# Patient Record
Sex: MALE | Race: WHITE | NOT HISPANIC OR LATINO
[De-identification: names, ages, dates, MRNs, and addresses within clinical notes are randomized per-mention and may not be internally consistent; named-entity substitution may affect disease eponyms.]

---

## 2022-01-01 ENCOUNTER — APPOINTMENT (OUTPATIENT)
Dept: GASTROENTEROLOGY | Facility: GI CENTER | Age: 87
End: 2022-01-01

## 2022-01-01 ENCOUNTER — NON-APPOINTMENT (OUTPATIENT)
Age: 87
End: 2022-01-01

## 2022-01-14 ENCOUNTER — APPOINTMENT (OUTPATIENT)
Dept: GASTROENTEROLOGY | Facility: CLINIC | Age: 87
End: 2022-01-14
Payer: MEDICARE

## 2022-01-14 VITALS
OXYGEN SATURATION: 98 % | TEMPERATURE: 97.2 F | HEART RATE: 94 BPM | BODY MASS INDEX: 29.06 KG/M2 | DIASTOLIC BLOOD PRESSURE: 82 MMHG | SYSTOLIC BLOOD PRESSURE: 142 MMHG | HEIGHT: 63 IN | WEIGHT: 164 LBS | RESPIRATION RATE: 14 BRPM

## 2022-01-14 DIAGNOSIS — K62.5 HEMORRHAGE OF ANUS AND RECTUM: ICD-10-CM

## 2022-01-14 DIAGNOSIS — Z78.9 OTHER SPECIFIED HEALTH STATUS: ICD-10-CM

## 2022-01-14 DIAGNOSIS — Z86.79 PERSONAL HISTORY OF OTHER DISEASES OF THE CIRCULATORY SYSTEM: ICD-10-CM

## 2022-01-14 DIAGNOSIS — Z85.46 PERSONAL HISTORY OF MALIGNANT NEOPLASM OF PROSTATE: ICD-10-CM

## 2022-01-14 DIAGNOSIS — R58 HEMORRHAGE, NOT ELSEWHERE CLASSIFIED: ICD-10-CM

## 2022-01-14 PROBLEM — Z00.00 ENCOUNTER FOR PREVENTIVE HEALTH EXAMINATION: Status: ACTIVE | Noted: 2022-01-14

## 2022-01-14 PROCEDURE — 99204 OFFICE O/P NEW MOD 45 MIN: CPT

## 2022-01-14 RX ORDER — SODIUM SULFATE, POTASSIUM SULFATE, MAGNESIUM SULFATE 17.5; 3.13; 1.6 G/ML; G/ML; G/ML
17.5-3.13-1.6 SOLUTION, CONCENTRATE ORAL
Qty: 1 | Refills: 0 | Status: ACTIVE | COMMUNITY
Start: 2022-01-14 | End: 1900-01-01

## 2022-01-14 NOTE — HISTORY OF PRESENT ILLNESS
[de-identified] : The patient has undergone prior screening colonoscopies by Dr. Mcknight last of which was many years ago.  He does not recall ever having been told that he had colon polyps.  He was doing well until yesterday when he felt some moisture in his underpants and went to the bathroom at which time he cleansed the area with toilet paper and noted the presence of red blood.  It had an early morning bowel movement that same day which was unremarkable and another bowel movement today without any blood.  He denies any abdominal pain, nausea or vomiting.  There is no significant diarrhea or constipation.  His appetite and weight are stable.  Is no family history of colon cancer.  He denies any rectal or anal pain.  He has a history of prostate cancer for which she underwent radiation therapy many years ago.

## 2022-01-14 NOTE — PHYSICAL EXAM
[General Appearance - Alert] : alert [General Appearance - In No Acute Distress] : in no acute distress [General Appearance - Well Nourished] : well nourished [General Appearance - Well Developed] : well developed [General Appearance - Well-Appearing] : healthy appearing [Sclera] : the sclera and conjunctiva were normal [PERRL With Normal Accommodation] : pupils were equal in size, round, and reactive to light [Extraocular Movements] : extraocular movements were intact [Outer Ear] : the ears and nose were normal in appearance [Hearing Threshold Finger Rub Not [place]] : hearing was normal [Examination Of The Oral Cavity] : the lips and gums were normal [Neck Appearance] : the appearance of the neck was normal [Auscultation Breath Sounds / Voice Sounds] : lungs were clear to auscultation bilaterally [Heart Rate And Rhythm] : heart rate was normal and rhythm regular [Heart Sounds] : normal S1 and S2 [Heart Sounds Gallop] : no gallops [Murmurs] : no murmurs [Heart Sounds Pericardial Friction Rub] : no pericardial rub [Bowel Sounds] : normal bowel sounds [Abdomen Soft] : soft [Abdomen Tenderness] : non-tender [Abdomen Mass (___ Cm)] : no abdominal mass palpated [No CVA Tenderness] : no ~M costovertebral angle tenderness [No Spinal Tenderness] : no spinal tenderness [Abnormal Walk] : normal gait [Nail Clubbing] : no clubbing  or cyanosis of the fingernails [Musculoskeletal - Swelling] : no joint swelling seen [Motor Tone] : muscle strength and tone were normal [Skin Color & Pigmentation] : normal skin color and pigmentation [Skin Turgor] : normal skin turgor [] : no rash [Motor Exam] : the motor exam was normal [No Focal Deficits] : no focal deficits [Oriented To Time, Place, And Person] : oriented to person, place, and time [Impaired Insight] : insight and judgment were intact [Affect] : the affect was normal [Normal Sphincter Tone] : normal sphincter tone [No Rectal Mass] : no rectal mass [Occult Blood Positive] : stool was negative for occult blood [FreeTextEntry1] : It was scant brown stool present with no blood

## 2022-01-14 NOTE — ASSESSMENT
[FreeTextEntry1] : In all probability of the seepage of blood per rectum that occurred yesterday afternoon or evening was probably due to underlying hemorrhoids although none was seen or felt during the digital rectal exam.  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  I cannot exclude a distal colonic neoplasm either.  He will undergo a CBC, basic metabolic profile and prothrombin time and although he is 89 years old, he is a young 89 years and will be scheduled for colonoscopy as well.  He will require cardiac clearance. The risks, benefits, complications and possible adverse consequences associated with colonoscopy were discussed with the patient.\par

## 2022-02-15 ENCOUNTER — APPOINTMENT (OUTPATIENT)
Dept: GASTROENTEROLOGY | Facility: GI CENTER | Age: 87
End: 2022-02-15

## 2022-05-23 ENCOUNTER — TRANSCRIPTION ENCOUNTER (OUTPATIENT)
Age: 87
End: 2022-05-23

## 2022-05-26 ENCOUNTER — APPOINTMENT (OUTPATIENT)
Dept: DISASTER EMERGENCY | Facility: HOSPITAL | Age: 87
End: 2022-05-26

## 2023-01-01 ENCOUNTER — INPATIENT (INPATIENT)
Facility: HOSPITAL | Age: 88
LOS: 0 days | DRG: 871 | End: 2023-06-03
Attending: INTERNAL MEDICINE | Admitting: INTERNAL MEDICINE
Payer: MEDICARE

## 2023-01-01 VITALS
SYSTOLIC BLOOD PRESSURE: 85 MMHG | OXYGEN SATURATION: 94 % | RESPIRATION RATE: 22 BRPM | TEMPERATURE: 101 F | WEIGHT: 166.89 LBS | HEART RATE: 141 BPM | HEIGHT: 70 IN | DIASTOLIC BLOOD PRESSURE: 56 MMHG

## 2023-01-01 DIAGNOSIS — A41.9 SEPSIS, UNSPECIFIED ORGANISM: ICD-10-CM

## 2023-01-01 DIAGNOSIS — R77.8 OTHER SPECIFIED ABNORMALITIES OF PLASMA PROTEINS: ICD-10-CM

## 2023-01-01 LAB
ALBUMIN SERPL ELPH-MCNC: 3 G/DL — LOW (ref 3.3–5.2)
ALBUMIN SERPL ELPH-MCNC: 3.8 G/DL — SIGNIFICANT CHANGE UP (ref 3.3–5.2)
ALP SERPL-CCNC: 122 U/L — HIGH (ref 40–120)
ALP SERPL-CCNC: 132 U/L — HIGH (ref 40–120)
ALT FLD-CCNC: 30 U/L — SIGNIFICANT CHANGE UP
ALT FLD-CCNC: 87 U/L — HIGH
ANION GAP SERPL CALC-SCNC: 20 MMOL/L — HIGH (ref 5–17)
ANION GAP SERPL CALC-SCNC: 21 MMOL/L — HIGH (ref 5–17)
APPEARANCE UR: ABNORMAL
APTT BLD: 29.7 SEC — SIGNIFICANT CHANGE UP (ref 27.5–35.5)
AST SERPL-CCNC: 211 U/L — HIGH
AST SERPL-CCNC: 63 U/L — HIGH
BACTERIA # UR AUTO: ABNORMAL
BASE EXCESS BLDA CALC-SCNC: -19 MMOL/L — LOW (ref -2–3)
BASE EXCESS BLDA CALC-SCNC: 25.1 MMOL/L — HIGH (ref -2–3)
BASE EXCESS BLDV CALC-SCNC: -8.4 MMOL/L — LOW (ref -2–3)
BASE EXCESS BLDV CALC-SCNC: -9.2 MMOL/L — LOW (ref -2–3)
BASOPHILS # BLD AUTO: 0.02 K/UL — SIGNIFICANT CHANGE UP (ref 0–0.2)
BASOPHILS NFR BLD AUTO: 0.1 % — SIGNIFICANT CHANGE UP (ref 0–2)
BILIRUB SERPL-MCNC: 1 MG/DL — SIGNIFICANT CHANGE UP (ref 0.4–2)
BILIRUB SERPL-MCNC: 1.3 MG/DL — SIGNIFICANT CHANGE UP (ref 0.4–2)
BILIRUB UR-MCNC: NEGATIVE — SIGNIFICANT CHANGE UP
BLOOD GAS COMMENTS ARTERIAL: SIGNIFICANT CHANGE UP
BLOOD GAS COMMENTS ARTERIAL: SIGNIFICANT CHANGE UP
BUN SERPL-MCNC: 27.2 MG/DL — HIGH (ref 8–20)
BUN SERPL-MCNC: 30.9 MG/DL — HIGH (ref 8–20)
CA-I SERPL-SCNC: 1.08 MMOL/L — LOW (ref 1.15–1.33)
CALCIUM SERPL-MCNC: 7.9 MG/DL — LOW (ref 8.4–10.5)
CALCIUM SERPL-MCNC: 8.9 MG/DL — SIGNIFICANT CHANGE UP (ref 8.4–10.5)
CHLORIDE BLDV-SCNC: 99 MMOL/L — SIGNIFICANT CHANGE UP (ref 96–108)
CHLORIDE SERPL-SCNC: 100 MMOL/L — SIGNIFICANT CHANGE UP (ref 96–108)
CHLORIDE SERPL-SCNC: 96 MMOL/L — SIGNIFICANT CHANGE UP (ref 96–108)
CO2 SERPL-SCNC: 16 MMOL/L — LOW (ref 22–29)
CO2 SERPL-SCNC: 30 MMOL/L — HIGH (ref 22–29)
COLOR SPEC: ABNORMAL
COMMENT - URINE: SIGNIFICANT CHANGE UP
CREAT SERPL-MCNC: 1.79 MG/DL — HIGH (ref 0.5–1.3)
CREAT SERPL-MCNC: 1.94 MG/DL — HIGH (ref 0.5–1.3)
DIFF PNL FLD: ABNORMAL
EGFR: 32 ML/MIN/1.73M2 — LOW
EGFR: 35 ML/MIN/1.73M2 — LOW
EOSINOPHIL # BLD AUTO: 0 K/UL — SIGNIFICANT CHANGE UP (ref 0–0.5)
EOSINOPHIL NFR BLD AUTO: 0 % — SIGNIFICANT CHANGE UP (ref 0–6)
EPI CELLS # UR: SIGNIFICANT CHANGE UP
GAS PNL BLDA: SIGNIFICANT CHANGE UP
GAS PNL BLDV: 129 MMOL/L — LOW (ref 136–145)
GAS PNL BLDV: SIGNIFICANT CHANGE UP
GLUCOSE BLDC GLUCOMTR-MCNC: 196 MG/DL — HIGH (ref 70–99)
GLUCOSE BLDV-MCNC: 210 MG/DL — HIGH (ref 70–99)
GLUCOSE SERPL-MCNC: 206 MG/DL — HIGH (ref 70–99)
GLUCOSE SERPL-MCNC: 305 MG/DL — HIGH (ref 70–99)
GLUCOSE UR QL: NEGATIVE MG/DL — SIGNIFICANT CHANGE UP
HCO3 BLDA-SCNC: 12 MMOL/L — LOW (ref 21–28)
HCO3 BLDA-SCNC: 49 MMOL/L — CRITICAL HIGH (ref 21–28)
HCO3 BLDV-SCNC: 18 MMOL/L — LOW (ref 22–29)
HCO3 BLDV-SCNC: 18 MMOL/L — LOW (ref 22–29)
HCT VFR BLD CALC: 37.4 % — LOW (ref 39–50)
HCT VFR BLD CALC: 38.2 % — LOW (ref 39–50)
HCT VFR BLDA CALC: 39 % — SIGNIFICANT CHANGE UP
HGB BLD CALC-MCNC: 13.1 G/DL — SIGNIFICANT CHANGE UP (ref 12.6–17.4)
HGB BLD-MCNC: 12.4 G/DL — LOW (ref 13–17)
HGB BLD-MCNC: 13 G/DL — SIGNIFICANT CHANGE UP (ref 13–17)
HOROWITZ INDEX BLDA+IHG-RTO: 100 — SIGNIFICANT CHANGE UP
HOROWITZ INDEX BLDA+IHG-RTO: SIGNIFICANT CHANGE UP
IMM GRANULOCYTES NFR BLD AUTO: 0.5 % — SIGNIFICANT CHANGE UP (ref 0–0.9)
INR BLD: 1.25 RATIO — HIGH (ref 0.88–1.16)
KETONES UR-MCNC: ABNORMAL
LACTATE BLDV-MCNC: 5.4 MMOL/L — CRITICAL HIGH (ref 0.5–2)
LACTATE SERPL-SCNC: 10.6 MMOL/L — CRITICAL HIGH (ref 0.5–2)
LACTATE SERPL-SCNC: 11 MMOL/L — CRITICAL HIGH (ref 0.5–2)
LACTATE SERPL-SCNC: 13.1 MMOL/L — CRITICAL HIGH (ref 0.5–2)
LACTATE SERPL-SCNC: 13.6 MMOL/L — CRITICAL HIGH (ref 0.5–2)
LACTATE SERPL-SCNC: 8.6 MMOL/L — CRITICAL HIGH (ref 0.5–2)
LACTATE SERPL-SCNC: 9 MMOL/L — CRITICAL HIGH (ref 0.5–2)
LEUKOCYTE ESTERASE UR-ACNC: ABNORMAL
LYMPHOCYTES # BLD AUTO: 0.62 K/UL — LOW (ref 1–3.3)
LYMPHOCYTES # BLD AUTO: 3.7 % — LOW (ref 13–44)
MAGNESIUM SERPL-MCNC: 2.2 MG/DL — SIGNIFICANT CHANGE UP (ref 1.6–2.6)
MCHC RBC-ENTMCNC: 30.3 PG — SIGNIFICANT CHANGE UP (ref 27–34)
MCHC RBC-ENTMCNC: 30.7 PG — SIGNIFICANT CHANGE UP (ref 27–34)
MCHC RBC-ENTMCNC: 33.2 GM/DL — SIGNIFICANT CHANGE UP (ref 32–36)
MCHC RBC-ENTMCNC: 34 GM/DL — SIGNIFICANT CHANGE UP (ref 32–36)
MCV RBC AUTO: 90.3 FL — SIGNIFICANT CHANGE UP (ref 80–100)
MCV RBC AUTO: 91.4 FL — SIGNIFICANT CHANGE UP (ref 80–100)
MONOCYTES # BLD AUTO: 0.14 K/UL — SIGNIFICANT CHANGE UP (ref 0–0.9)
MONOCYTES NFR BLD AUTO: 0.8 % — LOW (ref 2–14)
MRSA PCR RESULT.: SIGNIFICANT CHANGE UP
NEUTROPHILS # BLD AUTO: 15.82 K/UL — HIGH (ref 1.8–7.4)
NEUTROPHILS NFR BLD AUTO: 94.9 % — HIGH (ref 43–77)
NITRITE UR-MCNC: NEGATIVE — SIGNIFICANT CHANGE UP
NT-PROBNP SERPL-SCNC: 3960 PG/ML — HIGH (ref 0–300)
PCO2 BLDA: 50 MMHG — HIGH (ref 35–48)
PCO2 BLDA: 67 MMHG — HIGH (ref 35–48)
PCO2 BLDV: 39 MMHG — LOW (ref 42–55)
PCO2 BLDV: 40 MMHG — LOW (ref 42–55)
PH BLDA: 7 — CRITICAL LOW (ref 7.35–7.45)
PH BLDA: 7.47 — HIGH (ref 7.35–7.45)
PH BLDV: 7.26 — LOW (ref 7.32–7.43)
PH BLDV: 7.28 — LOW (ref 7.32–7.43)
PH UR: 6.5 — SIGNIFICANT CHANGE UP (ref 5–8)
PHOSPHATE SERPL-MCNC: 4.2 MG/DL — SIGNIFICANT CHANGE UP (ref 2.4–4.7)
PLATELET # BLD AUTO: 109 K/UL — LOW (ref 150–400)
PLATELET # BLD AUTO: 229 K/UL — SIGNIFICANT CHANGE UP (ref 150–400)
PO2 BLDA: 60 MMHG — LOW (ref 83–108)
PO2 BLDA: 72 MMHG — LOW (ref 83–108)
PO2 BLDV: 140 MMHG — HIGH (ref 25–45)
PO2 BLDV: 92 MMHG — HIGH (ref 25–45)
POTASSIUM BLDV-SCNC: 4.5 MMOL/L — SIGNIFICANT CHANGE UP (ref 3.5–5.1)
POTASSIUM SERPL-MCNC: 4.6 MMOL/L — SIGNIFICANT CHANGE UP (ref 3.5–5.3)
POTASSIUM SERPL-MCNC: 4.9 MMOL/L — SIGNIFICANT CHANGE UP (ref 3.5–5.3)
POTASSIUM SERPL-SCNC: 4.6 MMOL/L — SIGNIFICANT CHANGE UP (ref 3.5–5.3)
POTASSIUM SERPL-SCNC: 4.9 MMOL/L — SIGNIFICANT CHANGE UP (ref 3.5–5.3)
PROT SERPL-MCNC: 5.3 G/DL — LOW (ref 6.6–8.7)
PROT SERPL-MCNC: 6.9 G/DL — SIGNIFICANT CHANGE UP (ref 6.6–8.7)
PROT UR-MCNC: 100
PROTHROM AB SERPL-ACNC: 14.5 SEC — HIGH (ref 10.5–13.4)
RAPID RVP RESULT: SIGNIFICANT CHANGE UP
RBC # BLD: 4.09 M/UL — LOW (ref 4.2–5.8)
RBC # BLD: 4.23 M/UL — SIGNIFICANT CHANGE UP (ref 4.2–5.8)
RBC # FLD: 11.9 % — SIGNIFICANT CHANGE UP (ref 10.3–14.5)
RBC # FLD: 12 % — SIGNIFICANT CHANGE UP (ref 10.3–14.5)
RBC CASTS # UR COMP ASSIST: >50 /HPF (ref 0–4)
S AUREUS DNA NOSE QL NAA+PROBE: SIGNIFICANT CHANGE UP
SAO2 % BLDA: 91.1 % — LOW (ref 94–98)
SAO2 % BLDA: 91.8 % — LOW (ref 94–98)
SAO2 % BLDV: 100 % — SIGNIFICANT CHANGE UP
SAO2 % BLDV: 98.5 % — SIGNIFICANT CHANGE UP
SARS-COV-2 RNA SPEC QL NAA+PROBE: SIGNIFICANT CHANGE UP
SODIUM SERPL-SCNC: 132 MMOL/L — LOW (ref 135–145)
SODIUM SERPL-SCNC: 150 MMOL/L — HIGH (ref 135–145)
SP GR SPEC: 1.01 — SIGNIFICANT CHANGE UP (ref 1.01–1.02)
TROPONIN T SERPL-MCNC: 0.03 NG/ML — SIGNIFICANT CHANGE UP (ref 0–0.06)
TROPONIN T SERPL-MCNC: 1.23 NG/ML — HIGH (ref 0–0.06)
TROPONIN T SERPL-MCNC: 7.82 NG/ML — HIGH (ref 0–0.06)
UROBILINOGEN FLD QL: NEGATIVE MG/DL — SIGNIFICANT CHANGE UP
WBC # BLD: 16.68 K/UL — HIGH (ref 3.8–10.5)
WBC # BLD: 36.18 K/UL — HIGH (ref 3.8–10.5)
WBC # FLD AUTO: 16.68 K/UL — HIGH (ref 3.8–10.5)
WBC # FLD AUTO: 36.18 K/UL — HIGH (ref 3.8–10.5)
WBC UR QL: ABNORMAL /HPF (ref 0–5)

## 2023-01-01 PROCEDURE — 84484 ASSAY OF TROPONIN QUANT: CPT

## 2023-01-01 PROCEDURE — 96365 THER/PROPH/DIAG IV INF INIT: CPT

## 2023-01-01 PROCEDURE — 71250 CT THORAX DX C-: CPT | Mod: 26

## 2023-01-01 PROCEDURE — 36620 INSERTION CATHETER ARTERY: CPT

## 2023-01-01 PROCEDURE — 87086 URINE CULTURE/COLONY COUNT: CPT

## 2023-01-01 PROCEDURE — 82803 BLOOD GASES ANY COMBINATION: CPT

## 2023-01-01 PROCEDURE — 74176 CT ABD & PELVIS W/O CONTRAST: CPT | Mod: 26

## 2023-01-01 PROCEDURE — 85730 THROMBOPLASTIN TIME PARTIAL: CPT

## 2023-01-01 PROCEDURE — 85027 COMPLETE CBC AUTOMATED: CPT

## 2023-01-01 PROCEDURE — 87040 BLOOD CULTURE FOR BACTERIA: CPT

## 2023-01-01 PROCEDURE — 82962 GLUCOSE BLOOD TEST: CPT

## 2023-01-01 PROCEDURE — 93970 EXTREMITY STUDY: CPT

## 2023-01-01 PROCEDURE — 99291 CRITICAL CARE FIRST HOUR: CPT | Mod: 25

## 2023-01-01 PROCEDURE — 83605 ASSAY OF LACTIC ACID: CPT

## 2023-01-01 PROCEDURE — 82330 ASSAY OF CALCIUM: CPT

## 2023-01-01 PROCEDURE — 82947 ASSAY GLUCOSE BLOOD QUANT: CPT

## 2023-01-01 PROCEDURE — 80053 COMPREHEN METABOLIC PANEL: CPT

## 2023-01-01 PROCEDURE — 84132 ASSAY OF SERUM POTASSIUM: CPT

## 2023-01-01 PROCEDURE — 96366 THER/PROPH/DIAG IV INF ADDON: CPT

## 2023-01-01 PROCEDURE — 71045 X-RAY EXAM CHEST 1 VIEW: CPT | Mod: 26,77

## 2023-01-01 PROCEDURE — 36600 WITHDRAWAL OF ARTERIAL BLOOD: CPT

## 2023-01-01 PROCEDURE — 83735 ASSAY OF MAGNESIUM: CPT

## 2023-01-01 PROCEDURE — 99291 CRITICAL CARE FIRST HOUR: CPT

## 2023-01-01 PROCEDURE — 83880 ASSAY OF NATRIURETIC PEPTIDE: CPT

## 2023-01-01 PROCEDURE — 94003 VENT MGMT INPAT SUBQ DAY: CPT

## 2023-01-01 PROCEDURE — 81001 URINALYSIS AUTO W/SCOPE: CPT

## 2023-01-01 PROCEDURE — 70450 CT HEAD/BRAIN W/O DYE: CPT | Mod: 26

## 2023-01-01 PROCEDURE — 93010 ELECTROCARDIOGRAM REPORT: CPT | Mod: 76

## 2023-01-01 PROCEDURE — 71250 CT THORAX DX C-: CPT | Mod: MA

## 2023-01-01 PROCEDURE — 0225U NFCT DS DNA&RNA 21 SARSCOV2: CPT

## 2023-01-01 PROCEDURE — 87641 MR-STAPH DNA AMP PROBE: CPT

## 2023-01-01 PROCEDURE — 87640 STAPH A DNA AMP PROBE: CPT

## 2023-01-01 PROCEDURE — 84295 ASSAY OF SERUM SODIUM: CPT

## 2023-01-01 PROCEDURE — 84100 ASSAY OF PHOSPHORUS: CPT

## 2023-01-01 PROCEDURE — 70450 CT HEAD/BRAIN W/O DYE: CPT | Mod: MD

## 2023-01-01 PROCEDURE — 85610 PROTHROMBIN TIME: CPT

## 2023-01-01 PROCEDURE — 82435 ASSAY OF BLOOD CHLORIDE: CPT

## 2023-01-01 PROCEDURE — 93970 EXTREMITY STUDY: CPT | Mod: 26

## 2023-01-01 PROCEDURE — 85018 HEMOGLOBIN: CPT

## 2023-01-01 PROCEDURE — 87186 SC STD MICRODIL/AGAR DIL: CPT

## 2023-01-01 PROCEDURE — 93005 ELECTROCARDIOGRAM TRACING: CPT

## 2023-01-01 PROCEDURE — 71045 X-RAY EXAM CHEST 1 VIEW: CPT

## 2023-01-01 PROCEDURE — 71045 X-RAY EXAM CHEST 1 VIEW: CPT | Mod: 26

## 2023-01-01 PROCEDURE — 94760 N-INVAS EAR/PLS OXIMETRY 1: CPT

## 2023-01-01 PROCEDURE — 94002 VENT MGMT INPAT INIT DAY: CPT

## 2023-01-01 PROCEDURE — 94660 CPAP INITIATION&MGMT: CPT

## 2023-01-01 PROCEDURE — 36415 COLL VENOUS BLD VENIPUNCTURE: CPT

## 2023-01-01 PROCEDURE — 74176 CT ABD & PELVIS W/O CONTRAST: CPT | Mod: MA

## 2023-01-01 PROCEDURE — 36569 INSJ PICC 5 YR+ W/O IMAGING: CPT

## 2023-01-01 PROCEDURE — 85014 HEMATOCRIT: CPT

## 2023-01-01 PROCEDURE — 85025 COMPLETE CBC W/AUTO DIFF WBC: CPT

## 2023-01-01 RX ORDER — NOREPINEPHRINE BITARTRATE/D5W 8 MG/250ML
0.05 PLASTIC BAG, INJECTION (ML) INTRAVENOUS
Qty: 8 | Refills: 0 | Status: DISCONTINUED | OUTPATIENT
Start: 2023-01-01 | End: 2023-01-01

## 2023-01-01 RX ORDER — VANCOMYCIN HCL 1 G
VIAL (EA) INTRAVENOUS
Refills: 0 | Status: DISCONTINUED | OUTPATIENT
Start: 2023-01-01 | End: 2023-01-01

## 2023-01-01 RX ORDER — PIPERACILLIN AND TAZOBACTAM 4; .5 G/20ML; G/20ML
3.38 INJECTION, POWDER, LYOPHILIZED, FOR SOLUTION INTRAVENOUS ONCE
Refills: 0 | Status: COMPLETED | OUTPATIENT
Start: 2023-01-01 | End: 2023-01-01

## 2023-01-01 RX ORDER — HYDROMORPHONE HYDROCHLORIDE 2 MG/ML
0.5 INJECTION INTRAMUSCULAR; INTRAVENOUS; SUBCUTANEOUS
Qty: 100 | Refills: 0 | Status: DISCONTINUED | OUTPATIENT
Start: 2023-01-01 | End: 2023-01-01

## 2023-01-01 RX ORDER — VANCOMYCIN HCL 1 G
1000 VIAL (EA) INTRAVENOUS ONCE
Refills: 0 | Status: DISCONTINUED | OUTPATIENT
Start: 2023-01-01 | End: 2023-01-01

## 2023-01-01 RX ORDER — FUROSEMIDE 40 MG
40 TABLET ORAL ONCE
Refills: 0 | Status: DISCONTINUED | OUTPATIENT
Start: 2023-01-01 | End: 2023-01-01

## 2023-01-01 RX ORDER — NOREPINEPHRINE BITARTRATE/D5W 8 MG/250ML
0.05 PLASTIC BAG, INJECTION (ML) INTRAVENOUS
Qty: 16 | Refills: 0 | Status: DISCONTINUED | OUTPATIENT
Start: 2023-01-01 | End: 2023-01-01

## 2023-01-01 RX ORDER — PIPERACILLIN AND TAZOBACTAM 4; .5 G/20ML; G/20ML
3.38 INJECTION, POWDER, LYOPHILIZED, FOR SOLUTION INTRAVENOUS EVERY 8 HOURS
Refills: 0 | Status: DISCONTINUED | OUTPATIENT
Start: 2023-01-01 | End: 2023-01-01

## 2023-01-01 RX ORDER — HYDROMORPHONE HYDROCHLORIDE 2 MG/ML
0.5 INJECTION INTRAMUSCULAR; INTRAVENOUS; SUBCUTANEOUS
Qty: 10 | Refills: 0 | Status: DISCONTINUED | OUTPATIENT
Start: 2023-01-01 | End: 2023-01-01

## 2023-01-01 RX ORDER — ACETAMINOPHEN 500 MG
1000 TABLET ORAL ONCE
Refills: 0 | Status: COMPLETED | OUTPATIENT
Start: 2023-01-01 | End: 2023-01-01

## 2023-01-01 RX ORDER — SODIUM CHLORIDE 9 MG/ML
2300 INJECTION INTRAMUSCULAR; INTRAVENOUS; SUBCUTANEOUS ONCE
Refills: 0 | Status: COMPLETED | OUTPATIENT
Start: 2023-01-01 | End: 2023-01-01

## 2023-01-01 RX ORDER — VANCOMYCIN HCL 1 G
1000 VIAL (EA) INTRAVENOUS EVERY 24 HOURS
Refills: 0 | Status: DISCONTINUED | OUTPATIENT
Start: 2023-01-01 | End: 2023-01-01

## 2023-01-01 RX ORDER — PHENYLEPHRINE HYDROCHLORIDE 10 MG/ML
9 INJECTION INTRAVENOUS
Qty: 160 | Refills: 0 | Status: DISCONTINUED | OUTPATIENT
Start: 2023-01-01 | End: 2023-01-01

## 2023-01-01 RX ORDER — CHLORHEXIDINE GLUCONATE 213 G/1000ML
1 SOLUTION TOPICAL DAILY
Refills: 0 | Status: DISCONTINUED | OUTPATIENT
Start: 2023-01-01 | End: 2023-01-01

## 2023-01-01 RX ORDER — VASOPRESSIN 20 [USP'U]/ML
0.04 INJECTION INTRAVENOUS
Qty: 40 | Refills: 0 | Status: DISCONTINUED | OUTPATIENT
Start: 2023-01-01 | End: 2023-01-01

## 2023-01-01 RX ORDER — PHENYLEPHRINE HYDROCHLORIDE 10 MG/ML
1 INJECTION INTRAVENOUS
Qty: 40 | Refills: 0 | Status: DISCONTINUED | OUTPATIENT
Start: 2023-01-01 | End: 2023-01-01

## 2023-01-01 RX ORDER — VANCOMYCIN HCL 1 G
1000 VIAL (EA) INTRAVENOUS ONCE
Refills: 0 | Status: COMPLETED | OUTPATIENT
Start: 2023-01-01 | End: 2023-01-01

## 2023-01-01 RX ORDER — FUROSEMIDE 40 MG
80 TABLET ORAL ONCE
Refills: 0 | Status: COMPLETED | OUTPATIENT
Start: 2023-01-01 | End: 2023-01-01

## 2023-01-01 RX ORDER — BUMETANIDE 0.25 MG/ML
0.2 INJECTION INTRAMUSCULAR; INTRAVENOUS
Qty: 20 | Refills: 0 | Status: DISCONTINUED | OUTPATIENT
Start: 2023-01-01 | End: 2023-01-01

## 2023-01-01 RX ORDER — VANCOMYCIN HCL 1 G
1000 VIAL (EA) INTRAVENOUS EVERY 12 HOURS
Refills: 0 | Status: DISCONTINUED | OUTPATIENT
Start: 2023-01-01 | End: 2023-01-01

## 2023-01-01 RX ORDER — CHLORHEXIDINE GLUCONATE 213 G/1000ML
15 SOLUTION TOPICAL EVERY 12 HOURS
Refills: 0 | Status: DISCONTINUED | OUTPATIENT
Start: 2023-01-01 | End: 2023-01-01

## 2023-01-01 RX ADMIN — SODIUM CHLORIDE 2300 MILLILITER(S): 9 INJECTION INTRAMUSCULAR; INTRAVENOUS; SUBCUTANEOUS at 10:35

## 2023-01-01 RX ADMIN — PIPERACILLIN AND TAZOBACTAM 25 GRAM(S): 4; .5 INJECTION, POWDER, LYOPHILIZED, FOR SOLUTION INTRAVENOUS at 05:19

## 2023-01-01 RX ADMIN — Medication 3.55 MICROGRAM(S)/KG/MIN: at 23:04

## 2023-01-01 RX ADMIN — Medication 7.1 MICROGRAM(S)/KG/MIN: at 18:53

## 2023-01-01 RX ADMIN — Medication 1000 MILLIGRAM(S): at 10:05

## 2023-01-01 RX ADMIN — PIPERACILLIN AND TAZOBACTAM 25 GRAM(S): 4; .5 INJECTION, POWDER, LYOPHILIZED, FOR SOLUTION INTRAVENOUS at 22:58

## 2023-01-01 RX ADMIN — PHENYLEPHRINE HYDROCHLORIDE 28.4 MICROGRAM(S)/KG/MIN: 10 INJECTION INTRAVENOUS at 15:58

## 2023-01-01 RX ADMIN — PHENYLEPHRINE HYDROCHLORIDE 28.4 MICROGRAM(S)/KG/MIN: 10 INJECTION INTRAVENOUS at 18:53

## 2023-01-01 RX ADMIN — HYDROMORPHONE HYDROCHLORIDE 0.5 MG/HR: 2 INJECTION INTRAMUSCULAR; INTRAVENOUS; SUBCUTANEOUS at 21:05

## 2023-01-01 RX ADMIN — PIPERACILLIN AND TAZOBACTAM 25 GRAM(S): 4; .5 INJECTION, POWDER, LYOPHILIZED, FOR SOLUTION INTRAVENOUS at 13:48

## 2023-01-01 RX ADMIN — Medication 1000 MILLIGRAM(S): at 10:30

## 2023-01-01 RX ADMIN — CHLORHEXIDINE GLUCONATE 15 MILLILITER(S): 213 SOLUTION TOPICAL at 18:37

## 2023-01-01 RX ADMIN — Medication 166.67 MILLIGRAM(S): at 10:09

## 2023-01-01 RX ADMIN — CHLORHEXIDINE GLUCONATE 15 MILLILITER(S): 213 SOLUTION TOPICAL at 05:20

## 2023-01-01 RX ADMIN — PIPERACILLIN AND TAZOBACTAM 200 GRAM(S): 4; .5 INJECTION, POWDER, LYOPHILIZED, FOR SOLUTION INTRAVENOUS at 09:33

## 2023-01-01 RX ADMIN — Medication 7.1 MICROGRAM(S)/KG/MIN: at 11:36

## 2023-01-01 RX ADMIN — Medication 80 MILLIGRAM(S): at 15:53

## 2023-01-01 RX ADMIN — Medication 1000 MILLIGRAM(S): at 11:15

## 2023-01-01 RX ADMIN — Medication 7.1 MICROGRAM(S)/KG/MIN: at 15:58

## 2023-01-01 RX ADMIN — PHENYLEPHRINE HYDROCHLORIDE 128 MICROGRAM(S)/KG/MIN: 10 INJECTION INTRAVENOUS at 21:05

## 2023-01-01 RX ADMIN — SODIUM CHLORIDE 2300 MILLILITER(S): 9 INJECTION INTRAMUSCULAR; INTRAVENOUS; SUBCUTANEOUS at 09:25

## 2023-01-01 RX ADMIN — Medication 400 MILLIGRAM(S): at 09:50

## 2023-01-01 RX ADMIN — Medication 3.55 MICROGRAM(S)/KG/MIN: at 21:05

## 2023-01-01 RX ADMIN — PIPERACILLIN AND TAZOBACTAM 3.38 GRAM(S): 4; .5 INJECTION, POWDER, LYOPHILIZED, FOR SOLUTION INTRAVENOUS at 10:03

## 2023-06-02 NOTE — ED PROVIDER NOTE - CRITICAL CARE ATTENDING CONTRIBUTION TO CARE
Upon my evaluation, this patient had a high probability of imminent or life-threatening deterioration due to septic shock  , which required my direct attention, intervention, and personal management.    I have personally provided _40  minutes of critical care time exclusive of time spent on separately billable procedures.  Time includes review of laboratory data, radiology results, discussion with consultants, and monitoring for potential decompensation.  Interventions were performed as documented.

## 2023-06-02 NOTE — H&P ADULT - NS PANP COMMENT GEN_ALL_CORE FT
89M w/ hx of prostate cancer with prior radiation on leupron, HTN, recurrent UTI presented to the ED from urology due to hematuria and generalized malaise. Pt was found to be febrile and hypotensive on presentation and received 2.5L of fluids with persistent hypotension requiring initiation of levophed. Pt also with progressively worsening hypoxia in the ED requiring initiation of BIPAP. CT head showed no acute findings. CT chest/abd/pelvis showed bilateral ground glass infiltrates, RLL consolidation, trace L hydroureteronephrosis and L perinephric stranding with cirrhosis, thick walled esophagus. Pt started on empiric broad spectrum abx. When seen in the ED, pt was awake and answering appropriately and appeared comfortable on BIPAP but remained on levophed 0.2mcg. Pt was eventually transitioned to nasal cannula. He was transported to the MICU and still awake and conversive with daughter during this time but suddenly decompensated on arrival to MICU becoming obtunded with agonal breathing and hypoxia. He quickly progressed to PEA arrest and CPR was initiated. He was intubated during resuscitation with bloody/frothy secretions noted. He underwent ACLS protocol ~20 mins with rhythm PEA/asystole with multiple epi, bicarb x 2 given. Fingerstick wnl. ROSC was subsequently obtained but pt was in profound shock requiring 3 pressors and was severely hypoxic with sats only high 80s on 100% PEEP 12. Pt with bilateral B lines and CXR with diffuse bilateral infiltrates worse on the L. He was given lasix with minimal urine output. He received paralytic/etomidate during intubation but no neurologic function noted post arrest. He was continued on broad spectrum abx. Troponin uptrended post arrest; was seen by cardiology with further ischemic eval pending neurologic recovery/stability/Echo. Ultrasound with lung sliding b/l, no large pericardial effusion, RV not grossly enlarged. Children and wife at the bedside were updated in detail regarding events leading up to his cardiac arrest and current critical state. They were advised that given the trajectory with increasing pressor requirements, high oxygen requirements and poor urine output, it is anticipated the patient will not survive through the night. His nephew Dr. Bro was also updated per family request regarding his escalating pressor requirements and high vent requirements with no noted neurologic recovery. Family reports understanding of his current situation and agree with DNR but would like to hold off on terminal extubation at this time.

## 2023-06-02 NOTE — H&P ADULT - HISTORY OF PRESENT ILLNESS
REASON FOR CONSULT: Shock    CONSULT REQUESTED BY: Dr. Shankar    Patient is a 89y old  Male who presents with a chief complaint of weakness    BRIEF HOSPITAL COURSE: Patient is a 89 yo male with PMH of prostate cancer treated with leupron, HTN and hx of frequent UTIs. Patient reported hematuria and urinary retention overnight, decided to come in for worsening weakness. In the ER, patient found to febrile, tachycardic, hypotensive and hypoxic. Sepsis work-up initiated - given vancomycin, zosyn, 30cc/kg IVF, Ofirmev, Cee placed and RVP. Patient with increasing oxygen requirements, initially placed on nasal cannula, uptitrated to NRB then ultimately to Bipap 12/5/50%. Levophed started for MAP <65. MICU consulted. Patients daughter (Elena) at bedside at time of evaluation. Reports that he did endorse chest discomfort with EMS, but currently chest pain free. Endorses breathing is much better and requesting to come off bipap.

## 2023-06-02 NOTE — CONSULT NOTE ADULT - SUBJECTIVE AND OBJECTIVE BOX
[facility]                                              CARDIOLOGY AT [place] [address]                                             Telephone: [phone]. Fax:[phone]                                                       CARDIOLOGY CONSULTATION NOTE                                                                                             History obtained by: Patient and medical record  Community Cardiologist:  Unknown   obtained: Yes [  ] No [  ] na  Reason for Consultation: PEA arrest, elevated trops.    Available out pt records reviewed: Yes [ x] No [  ]    Chief complaint:    Patient is a 89y old  Male who presents with a chief complaint of UTI and hematuria    HPI:  89 yo M hx of prostate ca on leupron, HTN, and HLD,  p/w hematuria, weakness, and fever.   Patient s/p PEA arrest in ICU.  Wife at bedside is a poor historian.  Wife states he had a UTI and saw his PCP and was on antibiotic since yesterday.  Today patient felt worse, chills, fever, and hematuria was noted so he presented to [facility].   Patient report hematuria and urinary retension last night. Today he felt very weak and tired. no sob. no chest pain. no abdominal pain. patient found to be febrile, tachycardic, hypotensive, and hypoxic. sepsis work up initiated.     CARDIAC TESTING   ECHO:    STRESS:    CATH:     ELECTROPHYSIOLOGY:     PAST MEDICAL HISTORY  HTN  HDL    PAST SURGICAL HISTORY    SOCIAL HISTORY:  Denies smoking/alcohol/drugs  CIGARETTES:     ALCOHOL:  DRUGS:    FAMILY HISTORY:  Uknown  Family History of Cardiovascular Disease:  Yes [  ] No [  ]  Coronary Artery Disease in first degree relative: Yes [  ] No [  ]  Sudden Cardiac Death in First degree relative: Yes [  ] No [  ]    HOME MEDICATIONS:      CURRENT CARDIAC MEDICATIONS:  norepinephrine Infusion 0.05 MICROgram(s)/kG/Min IV Continuous <Continuous>  phenylephrine    Infusion 1 MICROgram(s)/kG/Min IV Continuous <Continuous>    CURRENT OTHER MEDICATIONS:  chlorhexidine 0.12% Liquid 15 milliLiter(s) Oral Mucosa every 12 hours  piperacillin/tazobactam IVPB.. 3.375 Gram(s) IV Intermittent every 8 hours, Stop order after: 7 Days  vasopressin Infusion 0.04 Unit(s)/Min (6 mL/Hr) IV Continuous <Continuous>    ALLERGIES:   No Known Allergies      REVIEW OF SYMPTOMS:  - unable to attain    VITAL SIGNS:  T(C): 36.4 (23 @ 16:00), Max: 38.3 (23 @ 08:55)  T(F): 97.6 (23 @ 16:00), Max: 101 (23 @ 08:55)  HR: 142 (23 @ 17:00) (78 - 185)  BP: 121/75 (23 @ 17:00) (75/56 - 182/114)  RR: 22 (23 @ 17:00) (14 - 28)  SpO2: 90% (23 @ 17:00) (84% - 100%)    INTAKE AND OUTPUT:      @ 07:01  -   @ 17:21  --------------------------------------------------------  IN: 462 mL / OUT: 0 mL / NET: 462 mL  PHYSICAL EXAM:  Constitutional:   Obtunded, vented  HEENT: Atraumatic and normocephalic , neck is supple . no JVD. No carotid bruit.  CNS: A&Ox 0, does not respond to tactile or verbal stimuli  Respiratory: CTAB, unlabored   Cardiovascular: RRR normal s1 s2. No murmur. No rubs or gallop.  Gastrointestinal: Soft, non-tender. +Bowel sounds.   Extremities: + Peripheral Pulses, No clubbing, cyanosis, or edema  Psychiatric: Calm . no agitation.   Skin: Warm and dry, no ulcers on extremities     LABS:  ( 2023 15:02 )  Troponin T  1.23<H>,  CPK  X    , CKMB  X    , BNP X        , ( 2023 09:05 )  Troponin T  0.03 ,  CPK  X    , CKMB  X    , BNP X                           13.0   36.18 )-----------( 109      ( 2023 15:02 )             38.2     06-02    150<H>  |  100  |  27.2<H>  ----------------------------<  305<H>  4.6   |  30.0<H>  |  1.79<H>    Ca    7.9<L>      2023 15:02  Phos  4.2     06-02  Mg     2.2     06-02    TPro  5.3<L>  /  Alb  3.0<L>  /  TBili  1.0  /  DBili  x   /  AST  211<H>  /  ALT  87<H>  /  AlkPhos  132<H>  06-02    PT/INR - ( 2023 09:05 )   PT: 14.5 sec;   INR: 1.25 ratio       PTT - ( 2023 09:05 )  PTT:29.7 sec  Urinalysis Basic - ( 2023 11:17 )    Color: Red / Appearance: Slightly Turbid / S.010 / pH: x  Gluc: x / Ketone: Small  / Bili: Negative / Urobili: Negative mg/dL   Blood: x / Protein: 100 / Nitrite: Negative   Leuk Esterase: Small / RBC: >50 /HPF / WBC 26-50 /HPF   Sq Epi: x / Non Sq Epi: x / Bacteria: Few    INTERPRETATION OF TELEMETRY:   St 140    ECG:  St, RBBB,  and Q waves in V1 and V2.  Prior ECG: Yes [  ] No [  ]                                                  [facility]                                              CARDIOLOGY AT [place] [address]                                             Telephone: [phone]. Fax:[phone]                                                       CARDIOLOGY CONSULTATION NOTE                                                                                             History obtained by: Patient and medical record  Community Cardiologist:  Unknown   obtained: Yes [  ] No [  ] na  Reason for Consultation: PEA arrest, elevated trops.    Available out pt records reviewed: Yes [ x] No [  ]    Chief complaint:    Patient is a 89y old  Male who presents with a chief complaint of UTI and hematuria    HPI:  89 yo M hx of prostate ca on leupron, HTN, and HLD,  p/w hematuria, retention, weakness, and fever.   Patient now s/p PEA arrest in ICU.   Wife at bedside - a poor historian.  Wife states he had a UTI and saw his PCP and was started on antibiotic therapy since yesterday.  Today patient felt worse, with chills, fever, hematuria and retention and presented to [facility].  In ED patient also found to be febrile, tachycardic, hypotensive, and hypoxic and a sepsis work up was initiated.   He was placed on on bipap stabilized and transferred up to ICU were he became hypoxic and had a PEA  arrest.    A cardiac consult was called due to PEA arrest and elevated troponins.  Patient is currently vented, obtunded, on pressors and not responding to tactile or verbal stimuli.       CARDIAC TESTING   ECHO:    STRESS:    CATH:     ELECTROPHYSIOLOGY:     PAST MEDICAL HISTORY  HTN  HDL    PAST SURGICAL HISTORY    SOCIAL HISTORY:  Denies smoking/alcohol/drugs  CIGARETTES:     ALCOHOL:  DRUGS:    FAMILY HISTORY:  Uknown  Family History of Cardiovascular Disease:  Yes [  ] No [  ]  Coronary Artery Disease in first degree relative: Yes [  ] No [  ]  Sudden Cardiac Death in First degree relative: Yes [  ] No [  ]    HOME MEDICATIONS:      CURRENT CARDIAC MEDICATIONS:  norepinephrine Infusion 0.05 MICROgram(s)/kG/Min IV Continuous <Continuous>  phenylephrine    Infusion 1 MICROgram(s)/kG/Min IV Continuous <Continuous>    CURRENT OTHER MEDICATIONS:  chlorhexidine 0.12% Liquid 15 milliLiter(s) Oral Mucosa every 12 hours  piperacillin/tazobactam IVPB.. 3.375 Gram(s) IV Intermittent every 8 hours, Stop order after: 7 Days  vasopressin Infusion 0.04 Unit(s)/Min (6 mL/Hr) IV Continuous <Continuous>    ALLERGIES:   No Known Allergies      REVIEW OF SYMPTOMS:  - unable to attain    VITAL SIGNS:  T(C): 36.4 (23 @ 16:00), Max: 38.3 (23 @ 08:55)  T(F): 97.6 (23 @ 16:00), Max: 101 (23 @ 08:55)  HR: 142 (23 @ 17:00) (78 - 185)  BP: 121/75 (23 @ 17:00) (75/56 - 182/114)  RR: 22 (23 @ 17:00) (14 - 28)  SpO2: 90% (23 @ 17:00) (84% - 100%)    INTAKE AND OUTPUT:      @ 07:01  -   @ 17:21  --------------------------------------------------------  IN: 462 mL / OUT: 0 mL / NET: 462 mL  PHYSICAL EXAM:  Constitutional:   Obtunded, vented  HEENT: Atraumatic and normocephalic , neck is supple . no JVD. No carotid bruit.  CNS: A&Ox 0, does not respond to tactile or verbal stimuli  Respiratory: CTAB, unlabored   Cardiovascular: RRR normal s1 s2. No murmur. No rubs or gallop.  Gastrointestinal: Soft, non-tender. +Bowel sounds.   Extremities: + Peripheral Pulses, No clubbing, cyanosis, or edema  Psychiatric: Calm . no agitation.   Skin: Warm and dry, no ulcers on extremities     LABS:  ( 2023 15:02 )  Troponin T  1.23<H>,  CPK  X    , CKMB  X    , BNP X        , ( 2023 09:05 )  Troponin T  0.03 ,  CPK  X    , CKMB  X    , BNP X                           13.0   36.18 )-----------( 109      ( 2023 15:02 )             38.2     06-02    150<H>  |  100  |  27.2<H>  ----------------------------<  305<H>  4.6   |  30.0<H>  |  1.79<H>    Ca    7.9<L>      2023 15:02  Phos  4.2     06-02  Mg     2.2     06-02    TPro  5.3<L>  /  Alb  3.0<L>  /  TBili  1.0  /  DBili  x   /  AST  211<H>  /  ALT  87<H>  /  AlkPhos  132<H>  06-02    PT/INR - ( 2023 09:05 )   PT: 14.5 sec;   INR: 1.25 ratio       PTT - ( 2023 09:05 )  PTT:29.7 sec  Urinalysis Basic - ( 2023 11:17 )    Color: Red / Appearance: Slightly Turbid / S.010 / pH: x  Gluc: x / Ketone: Small  / Bili: Negative / Urobili: Negative mg/dL   Blood: x / Protein: 100 / Nitrite: Negative   Leuk Esterase: Small / RBC: >50 /HPF / WBC 26-50 /HPF   Sq Epi: x / Non Sq Epi: x / Bacteria: Few    INTERPRETATION OF TELEMETRY:   St 140    ECG:  St, RBBB,  and Q waves in V1 and V2.  Prior ECG: Yes [  ] No [  ]

## 2023-06-02 NOTE — ED ADULT NURSE REASSESSMENT NOTE - NSFALLHARMRISKINTERV_ED_ALL_ED

## 2023-06-02 NOTE — ED PROVIDER NOTE - PROGRESS NOTE DETAILS
patient received about 2.5 L IVF, hypotensive, more lethargic. started on Levophed. cxr showed pulonary edema vs mild infiltrate. mental status improved in levophed and bipap. MICU at bedside for evaluation. Daughter spoke with other family member, full code. spoke with MICU, will admit him to ICU. patient wants to trial off BIPAP to drink something. ICU aware.

## 2023-06-02 NOTE — GOALS OF CARE CONVERSATION - ADVANCED CARE PLANNING - CONVERSATION DETAILS
Discussed with daughter and rest of family regarding poor prognosis, in triple pressor shock s/p PEA arrest. DNR/DNI was already in place. Family agrees to transitioning to comfort measures, capping pressors, but not ready to extubate until more family members see him. Gave updates in care, and addressed all relevant questions and concerns.      Diogo Murphy M.D.  , Pulmonary & Critical Care Medicine  [facility]  Pulmonary and Sleep Medicine at [place]  [address]  [place], [place] [address]  T: [phone]  F: [phone]

## 2023-06-02 NOTE — CONSULT NOTE ADULT - NS ATTEND AMEND GEN_ALL_CORE FT
Patient seen and examined at the bedside in the ICU, currently intubated and not sedated with no neurologic status, on 3 vasopressor support and still in shock state and hypoxemic on 100% FIo2.   Consulted for elevated troponins in the setting of PEA cardia arrest; recommend to continue with ICU management   CXR reviewed and post cardiac arrest appears to have pulmonary vascular congestion, recommend Lasix 80mg IVP if blood pressure can tolerate   Recommend to trend troponins and obtain TTE to assess LV function and valvuloapthy   Patient currently deemed poor prognosis, based on no neurologic status post cardiac arrest  Discussed current status with family and ICU team     Marianna Borrero D.O. [facility]  Cardiology/Vascular Cardiology -[facility]   Telephone # [phone]

## 2023-06-02 NOTE — H&P ADULT - ASSESSMENT
REASON FOR CONSULT: ***    CONSULT REQUESTED BY: ***    Patient is a 89y old  Male who presents with a chief complaint of     BRIEF HOSPITAL COURSE: ***    Events last 24 hours: ***    PAST MEDICAL & SURGICAL HISTORY:    Allergies    No Known Allergies    Intolerances      FAMILY HISTORY:      Review of Systems:  CONSTITUTIONAL: No fever, chills, or fatigue  EYES: No eye pain, visual disturbances, or discharge  ENMT:  No difficulty hearing, tinnitus, vertigo; No sinus or throat pain  NECK: No pain or stiffness  RESPIRATORY: No cough, wheezing, chills or hemoptysis; No shortness of breath  CARDIOVASCULAR: No chest pain, palpitations, dizziness, or leg swelling  GASTROINTESTINAL: No abdominal or epigastric pain. No nausea, vomiting, or hematemesis; No diarrhea or constipation. No melena or hematochezia.  GENITOURINARY: No dysuria, frequency, hematuria, or incontinence  NEUROLOGICAL: No headaches, memory loss, loss of strength, numbness, or tremors  SKIN: No itching, burning, rashes, or lesions   MUSCULOSKELETAL: No joint pain or swelling; No muscle, back, or extremity pain  PSYCHIATRIC: No depression, anxiety, mood swings, or difficulty sleeping      Medications:    norepinephrine Infusion 0.05 MICROgram(s)/kG/Min IV Continuous <Continuous>  phenylephrine    Infusion 1 MICROgram(s)/kG/Min IV Continuous <Continuous>                            Mode: AC/ CMV (Assist Control/ Continuous Mandatory Ventilation)  RR (machine): 16  TV (machine): 500  FiO2: 100  PEEP: 6  MAP: 14  PIP: 28      ICU Vital Signs Last 24 Hrs  T(C): 36.5 (2023 13:30), Max: 38.3 (2023 08:55)  T(F): 97.7 (2023 13:30), Max: 101 (2023 08:55)  HR: 135 (2023 15:45) (78 - 185)  BP: 99/60 (2023 15:45) (82/50 - 182/114)  BP(mean): 72 (2023 15:45) (63 - 133)  ABP: 91/54 (2023 15:45) (22/15 - 112/64)  ABP(mean): 68 (2023 15:45) (8 - 68)  RR: 22 (2023 15:45) (14 - 28)  SpO2: 90% (2023 15:45) (84% - 100%)    O2 Parameters below as of 2023 15:00  Patient On (Oxygen Delivery Method): ventilator    O2 Concentration (%): 100      Vital Signs Last 24 Hrs  T(C): 36.5 (2023 13:30), Max: 38.3 (2023 08:55)  T(F): 97.7 (2023 13:30), Max: 101 (2023 08:55)  HR: 135 (2023 15:45) (78 - 185)  BP: 99/60 (2023 15:45) (82/50 - 182/114)  BP(mean): 72 (2023 15:45) (63 - 133)  RR: 22 (2023 15:45) (14 - 28)  SpO2: 90% (2023 15:45) (84% - 100%)    Parameters below as of 2023 15:00  Patient On (Oxygen Delivery Method): ventilator    O2 Concentration (%): 100    ABG - ( 2023 15:41 )  pH, Arterial: 7.470 pH, Blood: x     /  pCO2: 67    /  pO2: 60    / HCO3: 49    / Base Excess: 25.1  /  SaO2: 91.8                I&O's Detail        LABS:                        13.0   36.18 )-----------( 109      ( 2023 15:02 )             38.2     06-02    150<H>  |  100  |  27.2<H>  ----------------------------<  305<H>  4.6   |  30.0<H>  |  1.79<H>    Ca    7.9<L>      2023 15:02  Phos  4.2     06-02  Mg     2.2     06-02    TPro  5.3<L>  /  Alb  3.0<L>  /  TBili  1.0  /  DBili  x   /  AST  211<H>  /  ALT  87<H>  /  AlkPhos  132<H>  06-02      CARDIAC MARKERS ( 2023 15:02 )  x     / 1.23 ng/mL / x     / x     / x      CARDIAC MARKERS ( 2023 09:05 )  x     / 0.03 ng/mL / x     / x     / x          CAPILLARY BLOOD GLUCOSE      POCT Blood Glucose.: 196 mg/dL (2023 14:51)    PT/INR - ( 2023 09:05 )   PT: 14.5 sec;   INR: 1.25 ratio         PTT - ( 2023 09:05 )  PTT:29.7 sec  Urinalysis Basic - ( 2023 11:17 )    Color: Red / Appearance: Slightly Turbid / S.010 / pH: x  Gluc: x / Ketone: Small  / Bili: Negative / Urobili: Negative mg/dL   Blood: x / Protein: 100 / Nitrite: Negative   Leuk Esterase: Small / RBC: >50 /HPF / WBC 26-50 /HPF   Sq Epi: x / Non Sq Epi: x / Bacteria: Few      CULTURES:      Physical Examination:    General: No acute distress.  Alert, oriented, interactive, nonfocal    HEENT: Pupils equal, reactive to light.  Symmetric.    PULM: Clear to auscultation bilaterally, no significant sputum production    CVS: Regular rate and rhythm, no murmurs, rubs, or gallops    ABD: Soft, nondistended, nontender, normoactive bowel sounds, no masses    EXT: No edema, nontender    SKIN: Warm and well perfused, no rashes noted.    RADIOLOGY: ***    CRITICAL CARE TIME SPENT: ***   A/P: 89 yo male with PMH of prostate cancer treated with leupron, HTN and hx of frequent UTIs presented with complaint of weakness now with:     1. Septic shock  2. Acute Hypoxic Respiratory Failure   3. DUARTE   4. Hematuria  5. Lactic acidosis  6. Pneumonia  7. UTI    Hypoxic on arrival, initial abg on NRB 7.37/29/68.  Patient initially seen in ER while on Bipap 12/5/50%.   CXR showed R>L bibasilar infiltrates.   Received Vancomycin and Zosyn.   Febrile with a leukocytosis.   UA + blood, WBC 25-50.  UCx and BCx sent.   RVP negative.   Received 30cc/kg IVF bolus, and remained hypotensive.   Started on levophed, increasing dose requirement.   Echo ordered and pending.   CT C/A/P showed Bilateral groundglass infiltrates and right lower lobe consolidation consistent with pneumonia. Basilar dependent atelectasis. Bladder wall thickening secondary to underdistention and/or cystitis. Trace left hydroureteronephrosis and left perinephric fat stranding.   Start broad spectrum antibiotics with Zosyn and Vancomycin.   Lactate initially 5.4, then cleared to 1.9.   CTH negative.   Cee for strict I&Os.  Assume DUARTE not CKD given hx reported from family.   Discussed code status with patient, does not want to be intubated but does want compressions. We educated that should he need compressions, he would need to be intubated. He said "that's not going to happen". So patient is to remain full code with no restrictions on treatment at this time.   Admit to ICU.  Discussed with Dr. Taylor.     **ADDENDUM:     After arrival to ICU, patient began agonal breathing and became unresponsive. Decision made to emergently intubate, however patient went into PEA arrest prior to. CPR initiated. Initial rhythm PEA. Upon intubation, copious pink frothy sputum noted requiring suctioning. Still hypoxic despite 100% FiO2 and PEEP 10. Worsening shock state, now requiring triple pressors - started neosynephrine and vasopressin in addition to levophed. Daughter, Elena at bedside at time of cardiac arrest. Patients children and wife called in to hospital. Patient now DNR. Cardiology consulted and following, echo pending. Trend trops.       Additional diagnosis: Cardiac arrest

## 2023-06-02 NOTE — H&P ADULT - NSHPREVIEWOFSYSTEMS_GEN_ALL_CORE
Review of Systems:  CONSTITUTIONAL: No fever, chills, or fatigue  EYES: No eye pain, visual disturbances, or discharge  ENMT:  No difficulty hearing, tinnitus, vertigo; No sinus or throat pain  NECK: No pain or stiffness  RESPIRATORY: No cough, wheezing, chills or hemoptysis; No shortness of breath  CARDIOVASCULAR: No chest pain, palpitations, dizziness, or leg swelling  GASTROINTESTINAL: No abdominal or epigastric pain. No nausea, vomiting, or hematemesis; No diarrhea or constipation. No melena or hematochezia.  GENITOURINARY: No dysuria, frequency, hematuria, or incontinence  NEUROLOGICAL: No headaches, memory loss, loss of strength, numbness, or tremors  SKIN: No itching, burning, rashes, or lesions   MUSCULOSKELETAL: No joint pain or swelling; No muscle, back, or extremity pain  PSYCHIATRIC: No depression, anxiety, mood swings, or difficulty sleeping

## 2023-06-02 NOTE — PROCEDURE NOTE - NSTRACHINTUB_RESP_A_CORE
Last vitals:   Vitals:    03/18/19 0840   BP: 124/61   Pulse: 78   Resp: 18   Temp: (!) 38.6  C (101.5  F)   SpO2: 100%     Patient's level of consciousness is intubated/sedated  Spontaneous respirations: no: intubated/sedated  Maintains airway independently: no: intubated/sedated  Dentition unchanged: yes  Oropharynx: endotracheal tube in place and oral gastric tube in place    QCDR Measures:  ASA# 20 - Surgical Safety Checklist: WHO surgical safety checklist completed prior to induction    PQRS# 430 - Adult PONV Prevention: 4558F-1P - Medical reason for NOT administering combination therapy  ASA# 8 - Peds PONV Prevention: NA - Not pediatric patient, not GA or 2 or more risk factors NOT present  PQRS# 424 - Asha-op Temp Management: 4559F - At least one body temp DOCUMENTED => 35.5C or 95.9F within required timeframe  PQRS# 426 - PACU Transfer Protocol: - Transfer of care checklist used  ASA# 14 - Acute Post-op Pain: NA - Patient under age 10y or did not go to PACU  
glidescope

## 2023-06-02 NOTE — ED PROVIDER NOTE - CLINICAL SUMMARY MEDICAL DECISION MAKING FREE TEXT BOX
2 yo M hx of prostate ca on leupron and HTN p/w hematuria, weakness, and fever. patient has frequent UTI. Patient report hematuria and urinary retension last night. patient found to be febrile, tachycardic, hypotensive, and hypoxic. sepsis work up initiated with vanc, zosyn, 30cc/kg IVF, ofirmev for fever, vazquez. cxr. rvp

## 2023-06-02 NOTE — ED ADULT NURSE NOTE - NS ED NURSE TRANSPORT WITH
Cardiac Monitor/Defib/ACLS/Rescue Kit/O2/BVM/oxygen/IV pump/pulse ox To CT scan then ICU/Cardiac Monitor/Defib/ACLS/Rescue Kit/O2/BVM/oxygen/IV pump/pulse ox

## 2023-06-02 NOTE — ED ADULT NURSE REASSESSMENT NOTE - NS ED NURSE REASSESS COMMENT FT1
PT with decline VS. C/O chest pain. Cool and diaphoretic with decline in mental status.  called to bedside. PT transported to Critical care room.
Pt A&O x 3 reports improvement with symptoms. Pt alert, awake and following directions. Pt remains on BiPAP. Pt on telemonitor. Denies chest pain. No JVD or diaphoresis. No LE edema. Family at bedside. Bed locked and in lowest position. Frequent checks made.

## 2023-06-02 NOTE — ED PROVIDER NOTE - OBJECTIVE STATEMENT
89 yo M hx of prostate ca on leupron and HTN p/w hematuria, weakness, and fever. patient has frequent UTI. Patient report hematuria and urinary retension last night. Today he felt very weak and tired. no sob. no chest pain. no abdominal pain. patient found to be febrile, tachycardic, hypotensive, and hypoxic. sepsis work up initiated.     urology: adonay

## 2023-06-02 NOTE — PATIENT PROFILE ADULT - FALL HARM RISK - HARM RISK INTERVENTIONS

## 2023-06-02 NOTE — H&P ADULT - NSHPPHYSICALEXAM_GEN_ALL_CORE
Physical Examination:    General: No acute distress.  On bipap.     HEENT: Pupils equal, reactive to light.  Symmetric.    PULM: Bibasilar crackles    CVS: Regular rate and rhythm, no murmurs, rubs, or gallops    ABD: Soft, nondistended, nontender, normoactive bowel sounds, no masses; +Hematuria in vazquez noted     EXT: No edema, nontender    SKIN: Warm

## 2023-06-02 NOTE — ED PROVIDER NOTE - NS ED ROS FT
Review of Systems  •	CONSTITUTIONAL - (+)  fever, no diaphoresis, no weight change  •	SKIN - no rash  •	HEMATOLOGIC - no bleeding, no bruising  •	EYES - no eye pain, no blurred vision  •	ENT - no change in hearing, no pain  •	RESPIRATORY - no shortness of breath, no cough  •	CARDIAC - no chest pain, no palpitations  •	GI - no abd pain, no nausea, no vomiting, no diarrhea, no constipation, no bleeding  •	GENITO-URINARY - no discharge, (+) dysuria; (+) hematuria,   •	ENDO - no polydipsia, no polyuria, no heat/no cold intolerance  •	MUSCULOSKELETAL - no joint pain, no swelling, no redness  •	NEUROLOGIC - no weakness, no headache, no anesthesia, no paresthesias  •	PSYCH - no anxiety, non suicidal, non homicidal, no hallucination, no depression

## 2023-06-02 NOTE — ED PROVIDER NOTE - PHYSICAL EXAMINATION
VITAL SIGNS: I have reviewed nursing notes and confirm.  CONSTITUTIONAL:  in no acute distress.  SKIN: Skin exam is warm and dry, no acute rash.  HEAD: Normocephalic; atraumatic.  EYES: PERRL, EOM intact; conjunctiva and sclera clear.  ENT: No nasal discharge; airway clear. Throat clear.  NECK: Supple; non tender.    CARD: (+)tachycardic   RESP: No wheezes,  no rales or rhonchi.   ABD:  soft; non-distended; non-tender;   EXT: Normal ROM. No clubbing, cyanosis or edema.  NEURO: Alert, oriented. Grossly unremarkable. No focal deficits.  moves all extremities,

## 2023-06-02 NOTE — CONSULT NOTE ADULT - ASSESSMENT
89 yo M hx of prostate ca on leupron, HTN, and HLD,  p/w hematuria, retention, weakness, and fever.   Patient now s/p PEA arrest in ICU.   Wife at bedside - a poor historian.  Wife states he had a UTI and saw his PCP and was started on antibiotic therapy since yesterday.  Today patient felt worse, with chills, fever, hematuria and retention and presented to [facility].  In ED patient also found to be febrile, tachycardic, hypotensive, and hypoxic and a sepsis work up was initiated.   He was placed on on bipap stabilized and transferred up to ICU were he became hypoxic and had a PEA  arrest.    A cardiac consult was called due to PEA arrest and elevated troponins.  Patient is currently vented, obtunded, on pressors and not responding to tactile or verbal stimuli.

## 2023-06-02 NOTE — H&P ADULT - NSHPLABSRESULTS_GEN_ALL_CORE
ABG - ( 2023 15:41 )  pH, Arterial: 7.470 pH, Blood: x     /  pCO2: 67    /  pO2: 60    / HCO3: 49    / Base Excess: 25.1  /  SaO2: 91.8                I&O's Detail        LABS:                        13.0   36.18 )-----------( 109      ( 2023 15:02 )             38.2     06-02    150<H>  |  100  |  27.2<H>  ----------------------------<  305<H>  4.6   |  30.0<H>  |  1.79<H>    Ca    7.9<L>      2023 15:02  Phos  4.2     06-02  Mg     2.2     06-02    TPro  5.3<L>  /  Alb  3.0<L>  /  TBili  1.0  /  DBili  x   /  AST  211<H>  /  ALT  87<H>  /  AlkPhos  132<H>  06-02      CARDIAC MARKERS ( 2023 15:02 )  x     / 1.23 ng/mL / x     / x     / x      CARDIAC MARKERS ( 2023 09:05 )  x     / 0.03 ng/mL / x     / x     / x          CAPILLARY BLOOD GLUCOSE      POCT Blood Glucose.: 196 mg/dL (2023 14:51)    PT/INR - ( 2023 09:05 )   PT: 14.5 sec;   INR: 1.25 ratio         PTT - ( 2023 09:05 )  PTT:29.7 sec  Urinalysis Basic - ( 2023 11:17 )    Color: Red / Appearance: Slightly Turbid / S.010 / pH: x  Gluc: x / Ketone: Small  / Bili: Negative / Urobili: Negative mg/dL   Blood: x / Protein: 100 / Nitrite: Negative   Leuk Esterase: Small / RBC: >50 /HPF / WBC 26-50 /HPF   Sq Epi: x / Non Sq Epi: x / Bacteria: Few  CULTURES:

## 2023-06-02 NOTE — PROCEDURE NOTE - NSPROCDETAILS_GEN_ALL_CORE
location identified, draped/prepped, sterile technique used, needle inserted/introduced/positive blood return obtained via catheter/connected to a pressurized flush line/sutured in place/hemostasis with direct pressure, dressing applied/Seldinger technique/all materials/supplies accounted for at end of procedure
guidewire recovered/lumen(s) aspirated and flushed/sterile dressing applied/sterile technique, catheter placed/ultrasound guidance with use of sterile gel and probe cove
patient pre-oxygenated, tube inserted, placement confirmed

## 2023-06-02 NOTE — ED ADULT NURSE NOTE - OBJECTIVE STATEMENT
PT is awake and alert. +tachypnea on 6L NC. Skin is hot and dry. PT C/O chest pain at this time and SOB. EKG performed and given to . Labs and meds administered by previous RN. PT changed and blood noticed in the diaper,  made aware. Condom cath placed on patient.

## 2023-06-02 NOTE — PROCEDURE NOTE - NSINDICATIONS_GEN_A_CORE
critical illness/emergency venous access
cardiac arrest/respiratory failure
critical patient/monitoring purposes

## 2023-06-02 NOTE — ED ADULT NURSE NOTE - NSFALLHARMRISKINTERV_ED_ALL_ED
Assistance OOB with selected safe patient handling equipment if applicable/Assistance with ambulation/Communicate risk of Fall with Harm to all staff, patient, and family/Monitor gait and stability/Provide visual cue: red socks, yellow wristband, yellow gown, etc/Reinforce activity limits and safety measures with patient and family/Bed in lowest position, wheels locked, appropriate side rails in place/Call bell, personal items and telephone in reach/Instruct patient to call for assistance before getting out of bed/chair/stretcher/Non-slip footwear applied when patient is off stretcher/[place] to call system/Physically safe environment - no spills, clutter or unnecessary equipment/Purposeful Proactive Rounding/Room/bathroom lighting operational, light cord in reach

## 2023-06-02 NOTE — CONSULT NOTE ADULT - PROBLEM SELECTOR RECOMMENDATION 9
Presented with hematuria and sepsis, S/P PEA arrest with elevated troponin.  No chest pain or SOB identified by staff or family.    Family in room states patient sees a cardiologist, , but they don't know who.  The wife and daughter were present.  The are poor historians and had varying informations.    Etiology of troponin could be septic shock vs demand ischemia   As of now cardiac historian is unknown.  Family will search thru records at home to find info if possible.  Continue to trend troponin until peak  TTE bedside.    EKG with q waves in V1 and V2, 's.    When stabilized consider ischemic work.

## 2023-06-03 NOTE — DISCHARGE NOTE FOR THE EXPIRED PATIENT - HOSPITAL COURSE
89M w/ hx of prostate cancer with prior radiation on leupron, HTN, recurrent UTI presented to the ED from urology due to hematuria and generalized malaise. Pt was found to be febrile and hypotensive on presentation and received 2.5L of fluids with persistent hypotension requiring initiation of levophed. Pt also with progressively worsening hypoxia in the ED requiring initiation of BIPAP. CT head showed no acute findings. CT chest/abd/pelvis showed bilateral ground glass infiltrates, RLL consolidation, trace L hydroureteronephrosis and L perinephric stranding with cirrhosis, thick walled esophagus. Pt started on empiric broad spectrum abx. When seen in the ED, pt was awake and answering appropriately and appeared comfortable on BIPAP but remained on levophed 0.2mcg. Pt was eventually transitioned to nasal cannula. He was transported to the MICU and still awake and conversive with daughter during this time but suddenly decompensated on arrival to MICU becoming obtunded with agonal breathing and hypoxia. He quickly progressed to PEA arrest and CPR was initiated. He was intubated during resuscitation with bloody/frothy secretions noted. He underwent ACLS protocol ~20 mins with rhythm PEA/asystole with multiple epi, bicarb x 2 given. Fingerstick wnl. ROSC was subsequently obtained but pt was in profound shock requiring 3 pressors and was severely hypoxic with sats only high 80s on 100% PEEP 12. Pt with bilateral B lines and CXR with diffuse bilateral infiltrates worse on the L. He was given lasix with minimal urine output. He received paralytic/etomidate during intubation but no neurologic function noted post arrest. He was continued on broad spectrum abx. Children and wife at the bedside were updated in detail regarding events leading up to his cardiac arrest and current critical state. They were advised that given the trajectory with increasing pressor requirements, high oxygen requirements and poor urine output, it is anticipated the patient will not survive through the night. His nephew Dr. Bro was also updated per family request regarding his escalating pressor requirements and high vent requirements with no noted neurologic recovery. Family opted to make patient DNR overnight.     Notified by RN at 7:28AM that patient went asystolic on monitor. I examined the patient and found asystole on monitor, no palpable pulses at carotid and femoral locations, the ventilator was disconnected, no spontaneous breath sounds were auscultated via stethoscope. There were no heart sounds auscultated via stethoscope at left and right sternal boarders as well at PMI, POCUS was performed by myself finding no evidence of vnetricular contraction in parasternal long/short or subxiphod views .The  skin was cyanotic and cool and pupils were fixed and dilated without reaction to light and he was pronounced dead 7:28AM on 6/3/23.

## 2023-06-03 NOTE — PROVIDER CONTACT NOTE (CRITICAL VALUE NOTIFICATION) - PERSON GIVING RESULT:
[place], [facility]
Gita, Judy
Judy Lal
[facility]
Y[facility]

## 2023-06-06 NOTE — CHART NOTE - NSCHARTNOTEFT_GEN_A_CORE
Update to H&P from 6/2/23     I have spent a total of 130 mins of nonconsecutive critical care time managing this patient.  This included review of relevant history, clinical examination, review of data and images, discussion of treatment with the multidisciplinary team and any consultants involved in this patient’s care as well as family discussion.     I affirm that this patient is critically ill and at high risk for sudden, fatal deterioration due to one or more of the above stated active issues. I managed/supervised life or organ support interventions that required frequent assessment. This time does not include bedside procedures that are documented separately.

## 2023-06-07 LAB
CULTURE RESULTS: SIGNIFICANT CHANGE UP
CULTURE RESULTS: SIGNIFICANT CHANGE UP
SPECIMEN SOURCE: SIGNIFICANT CHANGE UP
SPECIMEN SOURCE: SIGNIFICANT CHANGE UP
